# Patient Record
Sex: MALE | Race: WHITE | Employment: FULL TIME | URBAN - METROPOLITAN AREA
[De-identification: names, ages, dates, MRNs, and addresses within clinical notes are randomized per-mention and may not be internally consistent; named-entity substitution may affect disease eponyms.]

---

## 2020-06-26 ENCOUNTER — HOSPITAL ENCOUNTER (EMERGENCY)
Age: 75
Discharge: HOME | End: 2020-06-26
Payer: MEDICARE

## 2020-06-26 ENCOUNTER — APPOINTMENT (OUTPATIENT)
Dept: ULTRASOUND IMAGING | Age: 75
End: 2020-06-26
Payer: MEDICARE

## 2020-06-26 VITALS
RESPIRATION RATE: 16 BRPM | HEIGHT: 70 IN | DIASTOLIC BLOOD PRESSURE: 71 MMHG | BODY MASS INDEX: 27.2 KG/M2 | SYSTOLIC BLOOD PRESSURE: 128 MMHG | HEART RATE: 70 BPM | TEMPERATURE: 98.4 F | WEIGHT: 190 LBS | OXYGEN SATURATION: 98 %

## 2020-06-26 PROCEDURE — 93971 EXTREMITY STUDY: CPT

## 2020-06-26 PROCEDURE — 99283 EMERGENCY DEPT VISIT LOW MDM: CPT

## 2020-06-26 ASSESSMENT — PAIN SCALES - GENERAL: PAINLEVEL_OUTOF10: 5

## 2020-06-26 ASSESSMENT — PAIN DESCRIPTION - ORIENTATION: ORIENTATION: LEFT

## 2020-06-26 ASSESSMENT — PAIN DESCRIPTION - LOCATION: LOCATION: LEG

## 2020-06-26 ASSESSMENT — PAIN DESCRIPTION - PAIN TYPE: TYPE: ACUTE PAIN

## 2020-06-26 NOTE — ED PROVIDER NOTES
Paralysis, paresis, or recent cast immobilization of lower extremities no (0)     Recently bedridden ? 3 days, or major surgery requiring regional or                    general anesthetic in the past 12 weeks no (0)     Alternative diagnosis at least as likely yes (1)     TOTAL SCORE 2     * Those with Wells scores of two or more have a 28% chance of having DVT, those with    a lower score have 6% odds. ** Alternatively, Wells scores can be categorized as high if greater than two, moderate if      one or two, and low if less than one, with likelihoods of 53%, 17%, and 5%     respectively. Wells' Criteria for PE (possible score 0 to 12.5)       Clinical Signs & Symptoms of DVT   No      PE is #1 Dx or Equally Likely   No      Heart Rate >100   No      Immobillization at least 3 days or     Surgery in past 4 Weeks   No      Previous Diagnosed PE or DVT   No      Hemoptysis   No      Malignancy w/Tx in Past 6 Months or     Palliative Tx   No      TOTAL SCORE   0     Low Risk Group: 0-1.5 =  1.3-3 % incidence of PE  Mod Risk Group: 2-3.5 =  16.2 % Incidence of PE  Mod Risk Group: > 4 = 28% Incidence of PE  High Risk Group >5 = 40.6% Incidence of PE    ROS    Pertinent positives and negatives are stated within HPI, all other systems reviewed and are negative. Past Surgical History:  has a past surgical history that includes Kidney transplant; Kidney transplant (2001); Nasal septum surgery; knee surgery; Leg Surgery (Right); and Colonoscopy (12/28/2015). Social History:  reports that he has never smoked. He has quit using smokeless tobacco. He reports previous alcohol use. He reports that he does not use drugs. Family History: family history is not on file.    Allergies: Aspirin and Seasonal    Physical Exam          ED Triage Vitals   BP Temp Temp src Pulse Resp SpO2 Height Weight   06/26/20 1607 06/26/20 1604 -- 06/26/20 1604 06/26/20 1604 06/26/20 1604 06/26/20 1607 06/26/20 1607   128/71 98.4 °F (36.9 °C)  70 16 98 % 5' 10\" (1.778 m) 190 lb (86.2 kg)     Oxygen Saturation Interpretation: Normal.    General:  NAD. Alert and Oriented. Well-appearing. Skin:  Warm, dry. No rashes. Head:  Normocephalic. Atraumatic. Eyes:  EOMI. Conjunctiva normal.  ENT:  Oral mucosa moist.  Airway patent. Neck:  Supple. Normal ROM. Respiratory:  No respiratory distress. No labored breathing. Lungs clear without rales, rhonchi or wheezing. Cardiovascular:  Regular rate. Bilateral lower leg 1-2 peripheral edema. Extremities warm and good color. 2+ dorsalis pedis pulses bilateral.  Extremities:  Normal ROM. Localized tenderness to palpation to the posterior proximal left calf. Negative palpable cords. Dorsiflexion of the left ankle does exacerbate pain in the left calf. Back:  Normal ROM. Nontender to palpation. Neuro:  Alert and Oriented to person, place, time and situation. Normal LOC. Moves all extremities. Speech fluent. Psych:  Calm and Cooperative. Normal thought process. Normal judgement. Lab / Imaging Results   (All laboratory and radiology results have been personally reviewed by myself)  Labs:  No results found for this visit on 06/26/20. Imaging: All Radiology results interpreted by Radiologist unless otherwise noted. US DUP LOWER EXTREMITY LEFT JAE   Final Result   Negative for evidence of deep venous thrombosis in the left lower   extremity by color and spectral Doppler, as well as 2-D grayscale   ultrasound imaging. ED Course / Medical Decision Making   Medications - No data to display     Consult(s):   None    Procedure(s):   none    MDM:   Results discussed with patient. Advised to still follow-up with his family doctor. Counseling: The emergency provider has spoken with the patient and discussed todays results, in addition to providing specific details for the plan of care and counseling regarding the diagnosis and prognosis.   Questions are answered at this time and they are agreeable with the plan. Assessment      1. Leg swelling Stable   2. Pain of left calf New Problem     Plan   Discharge to home  Patient condition is good    New Medications     New Prescriptions    No medications on file     Electronically signed by AMADEO Cali   DD: 6/26/20  **This report was transcribed using voice recognition software. Every effort was made to ensure accuracy; however, inadvertent computerized transcription errors may be present.   END OF ED PROVIDER NOTE       Mio Cali  06/26/20 3067

## 2023-01-15 ENCOUNTER — HOSPITAL ENCOUNTER (INPATIENT)
Age: 78
LOS: 1 days | Discharge: HOME | DRG: 312 | End: 2023-01-16
Attending: EMERGENCY MEDICINE | Admitting: STUDENT IN AN ORGANIZED HEALTH CARE EDUCATION/TRAINING PROGRAM
Payer: MEDICARE

## 2023-01-15 ENCOUNTER — APPOINTMENT (OUTPATIENT)
Dept: GENERAL RADIOLOGY | Age: 78
DRG: 312 | End: 2023-01-15
Payer: MEDICARE

## 2023-01-15 DIAGNOSIS — R55 SYNCOPE AND COLLAPSE: Primary | ICD-10-CM

## 2023-01-15 LAB
ALBUMIN SERPL-MCNC: 3.5 G/DL (ref 3.5–5.2)
ALP BLD-CCNC: 72 U/L (ref 40–129)
ALT SERPL-CCNC: 22 U/L (ref 0–40)
ANION GAP SERPL CALCULATED.3IONS-SCNC: 15 MMOL/L (ref 7–16)
AST SERPL-CCNC: 28 U/L (ref 0–39)
BASOPHILS ABSOLUTE: 0.05 E9/L (ref 0–0.2)
BASOPHILS RELATIVE PERCENT: 0.6 % (ref 0–2)
BILIRUB SERPL-MCNC: 0.5 MG/DL (ref 0–1.2)
BUN BLDV-MCNC: 19 MG/DL (ref 6–23)
CALCIUM SERPL-MCNC: 10.3 MG/DL (ref 8.6–10.2)
CHLORIDE BLD-SCNC: 98 MMOL/L (ref 98–107)
CO2: 23 MMOL/L (ref 22–29)
CORTISOL TOTAL: 4.69 MCG/DL (ref 2.68–18.4)
CREAT SERPL-MCNC: 1.2 MG/DL (ref 0.7–1.2)
EOSINOPHILS ABSOLUTE: 0.03 E9/L (ref 0.05–0.5)
EOSINOPHILS RELATIVE PERCENT: 0.4 % (ref 0–6)
GFR SERPL CREATININE-BSD FRML MDRD: >60 ML/MIN/1.73
GLUCOSE BLD-MCNC: 216 MG/DL (ref 74–99)
HCT VFR BLD CALC: 46.7 % (ref 37–54)
HEMOGLOBIN: 15.1 G/DL (ref 12.5–16.5)
IMMATURE GRANULOCYTES #: 0.06 E9/L
IMMATURE GRANULOCYTES %: 0.7 % (ref 0–5)
INFLUENZA A BY PCR: NOT DETECTED
INFLUENZA B BY PCR: NOT DETECTED
LYMPHOCYTES ABSOLUTE: 1.57 E9/L (ref 1.5–4)
LYMPHOCYTES RELATIVE PERCENT: 18.9 % (ref 20–42)
MCH RBC QN AUTO: 28.7 PG (ref 26–35)
MCHC RBC AUTO-ENTMCNC: 32.3 % (ref 32–34.5)
MCV RBC AUTO: 88.6 FL (ref 80–99.9)
MONOCYTES ABSOLUTE: 0.92 E9/L (ref 0.1–0.95)
MONOCYTES RELATIVE PERCENT: 11.1 % (ref 2–12)
NEUTROPHILS ABSOLUTE: 5.67 E9/L (ref 1.8–7.3)
NEUTROPHILS RELATIVE PERCENT: 68.3 % (ref 43–80)
PDW BLD-RTO: 13 FL (ref 11.5–15)
PLATELET # BLD: 267 E9/L (ref 130–450)
PMV BLD AUTO: 9.9 FL (ref 7–12)
POTASSIUM SERPL-SCNC: 4.1 MMOL/L (ref 3.5–5)
PRO-BNP: 451 PG/ML (ref 0–450)
RBC # BLD: 5.27 E12/L (ref 3.8–5.8)
REASON FOR REJECTION: NORMAL
REJECTED TEST: NORMAL
SARS-COV-2, NAAT: NOT DETECTED
SODIUM BLD-SCNC: 136 MMOL/L (ref 132–146)
TOTAL PROTEIN: 6.8 G/DL (ref 6.4–8.3)
TROPONIN, HIGH SENSITIVITY: 11 NG/L (ref 0–11)
TSH SERPL DL<=0.05 MIU/L-ACNC: 3.65 UIU/ML (ref 0.27–4.2)
WBC # BLD: 8.3 E9/L (ref 4.5–11.5)

## 2023-01-15 PROCEDURE — 80053 COMPREHEN METABOLIC PANEL: CPT

## 2023-01-15 PROCEDURE — 6360000002 HC RX W HCPCS: Performed by: STUDENT IN AN ORGANIZED HEALTH CARE EDUCATION/TRAINING PROGRAM

## 2023-01-15 PROCEDURE — 85025 COMPLETE CBC W/AUTO DIFF WBC: CPT

## 2023-01-15 PROCEDURE — 93005 ELECTROCARDIOGRAM TRACING: CPT | Performed by: STUDENT IN AN ORGANIZED HEALTH CARE EDUCATION/TRAINING PROGRAM

## 2023-01-15 PROCEDURE — 84484 ASSAY OF TROPONIN QUANT: CPT

## 2023-01-15 PROCEDURE — 84443 ASSAY THYROID STIM HORMONE: CPT

## 2023-01-15 PROCEDURE — 93005 ELECTROCARDIOGRAM TRACING: CPT | Performed by: EMERGENCY MEDICINE

## 2023-01-15 PROCEDURE — 36415 COLL VENOUS BLD VENIPUNCTURE: CPT

## 2023-01-15 PROCEDURE — 2580000003 HC RX 258: Performed by: STUDENT IN AN ORGANIZED HEALTH CARE EDUCATION/TRAINING PROGRAM

## 2023-01-15 PROCEDURE — 87502 INFLUENZA DNA AMP PROBE: CPT

## 2023-01-15 PROCEDURE — 6370000000 HC RX 637 (ALT 250 FOR IP): Performed by: STUDENT IN AN ORGANIZED HEALTH CARE EDUCATION/TRAINING PROGRAM

## 2023-01-15 PROCEDURE — 83880 ASSAY OF NATRIURETIC PEPTIDE: CPT

## 2023-01-15 PROCEDURE — 87635 SARS-COV-2 COVID-19 AMP PRB: CPT

## 2023-01-15 PROCEDURE — 2060000000 HC ICU INTERMEDIATE R&B

## 2023-01-15 PROCEDURE — 99285 EMERGENCY DEPT VISIT HI MDM: CPT

## 2023-01-15 PROCEDURE — 82533 TOTAL CORTISOL: CPT

## 2023-01-15 PROCEDURE — 71045 X-RAY EXAM CHEST 1 VIEW: CPT

## 2023-01-15 RX ORDER — ACYCLOVIR 200 MG/1
400 CAPSULE ORAL EVERY 8 HOURS
Status: DISCONTINUED | OUTPATIENT
Start: 2023-01-15 | End: 2023-01-16 | Stop reason: HOSPADM

## 2023-01-15 RX ORDER — ACETAMINOPHEN 650 MG/1
650 SUPPOSITORY RECTAL EVERY 6 HOURS PRN
Status: DISCONTINUED | OUTPATIENT
Start: 2023-01-15 | End: 2023-01-16 | Stop reason: HOSPADM

## 2023-01-15 RX ORDER — MYCOPHENOLATE MOFETIL 250 MG/1
500 CAPSULE ORAL 2 TIMES DAILY
Status: DISCONTINUED | OUTPATIENT
Start: 2023-01-15 | End: 2023-01-16 | Stop reason: HOSPADM

## 2023-01-15 RX ORDER — LISINOPRIL 20 MG/1
20 TABLET ORAL DAILY
Status: DISCONTINUED | OUTPATIENT
Start: 2023-01-15 | End: 2023-01-15

## 2023-01-15 RX ORDER — ONDANSETRON 2 MG/ML
4 INJECTION INTRAMUSCULAR; INTRAVENOUS EVERY 6 HOURS PRN
Status: DISCONTINUED | OUTPATIENT
Start: 2023-01-15 | End: 2023-01-16 | Stop reason: HOSPADM

## 2023-01-15 RX ORDER — SULFAMETHOXAZOLE AND TRIMETHOPRIM 400; 80 MG/1; MG/1
1 TABLET ORAL DAILY
Status: DISCONTINUED | OUTPATIENT
Start: 2023-01-16 | End: 2023-01-16 | Stop reason: HOSPADM

## 2023-01-15 RX ORDER — SENNA PLUS 8.6 MG/1
1 TABLET ORAL DAILY PRN
Status: DISCONTINUED | OUTPATIENT
Start: 2023-01-15 | End: 2023-01-16 | Stop reason: HOSPADM

## 2023-01-15 RX ORDER — SODIUM CHLORIDE 9 MG/ML
INJECTION, SOLUTION INTRAVENOUS PRN
Status: DISCONTINUED | OUTPATIENT
Start: 2023-01-15 | End: 2023-01-16 | Stop reason: HOSPADM

## 2023-01-15 RX ORDER — POTASSIUM CHLORIDE 7.45 MG/ML
10 INJECTION INTRAVENOUS PRN
Status: DISCONTINUED | OUTPATIENT
Start: 2023-01-15 | End: 2023-01-16 | Stop reason: HOSPADM

## 2023-01-15 RX ORDER — CETIRIZINE HYDROCHLORIDE 10 MG/1
10 TABLET ORAL DAILY
Status: DISCONTINUED | OUTPATIENT
Start: 2023-01-15 | End: 2023-01-16 | Stop reason: HOSPADM

## 2023-01-15 RX ORDER — SIROLIMUS 1 MG/1
2 TABLET, FILM COATED ORAL DAILY
Status: DISCONTINUED | OUTPATIENT
Start: 2023-01-16 | End: 2023-01-16 | Stop reason: HOSPADM

## 2023-01-15 RX ORDER — PREDNISONE 1 MG/1
5 TABLET ORAL DAILY
Status: DISCONTINUED | OUTPATIENT
Start: 2023-01-15 | End: 2023-01-16 | Stop reason: HOSPADM

## 2023-01-15 RX ORDER — 0.9 % SODIUM CHLORIDE 0.9 %
1000 INTRAVENOUS SOLUTION INTRAVENOUS ONCE
Status: COMPLETED | OUTPATIENT
Start: 2023-01-15 | End: 2023-01-15

## 2023-01-15 RX ORDER — ENOXAPARIN SODIUM 100 MG/ML
40 INJECTION SUBCUTANEOUS DAILY
Status: DISCONTINUED | OUTPATIENT
Start: 2023-01-15 | End: 2023-01-16 | Stop reason: HOSPADM

## 2023-01-15 RX ORDER — ACETAMINOPHEN 325 MG/1
650 TABLET ORAL EVERY 6 HOURS PRN
Status: DISCONTINUED | OUTPATIENT
Start: 2023-01-15 | End: 2023-01-16 | Stop reason: HOSPADM

## 2023-01-15 RX ORDER — ONDANSETRON 4 MG/1
4 TABLET, ORALLY DISINTEGRATING ORAL EVERY 8 HOURS PRN
Status: DISCONTINUED | OUTPATIENT
Start: 2023-01-15 | End: 2023-01-16 | Stop reason: HOSPADM

## 2023-01-15 RX ORDER — SODIUM CHLORIDE 9 MG/ML
INJECTION, SOLUTION INTRAVENOUS ONCE
Status: COMPLETED | OUTPATIENT
Start: 2023-01-15 | End: 2023-01-16

## 2023-01-15 RX ORDER — LANOLIN ALCOHOL/MO/W.PET/CERES
3 CREAM (GRAM) TOPICAL NIGHTLY PRN
Status: DISCONTINUED | OUTPATIENT
Start: 2023-01-15 | End: 2023-01-16 | Stop reason: HOSPADM

## 2023-01-15 RX ORDER — CALCIUM CARBONATE 500(1250)
500 TABLET ORAL 2 TIMES DAILY
Status: DISCONTINUED | OUTPATIENT
Start: 2023-01-15 | End: 2023-01-16 | Stop reason: HOSPADM

## 2023-01-15 RX ORDER — SODIUM CHLORIDE 0.9 % (FLUSH) 0.9 %
10 SYRINGE (ML) INJECTION EVERY 12 HOURS SCHEDULED
Status: DISCONTINUED | OUTPATIENT
Start: 2023-01-15 | End: 2023-01-16 | Stop reason: HOSPADM

## 2023-01-15 RX ORDER — PRAVASTATIN SODIUM 10 MG
10 TABLET ORAL DAILY
Status: DISCONTINUED | OUTPATIENT
Start: 2023-01-15 | End: 2023-01-16 | Stop reason: HOSPADM

## 2023-01-15 RX ORDER — HYDRALAZINE HYDROCHLORIDE 20 MG/ML
10 INJECTION INTRAMUSCULAR; INTRAVENOUS EVERY 4 HOURS PRN
Status: DISCONTINUED | OUTPATIENT
Start: 2023-01-15 | End: 2023-01-16 | Stop reason: HOSPADM

## 2023-01-15 RX ORDER — POTASSIUM CHLORIDE 20 MEQ/1
40 TABLET, EXTENDED RELEASE ORAL PRN
Status: DISCONTINUED | OUTPATIENT
Start: 2023-01-15 | End: 2023-01-16 | Stop reason: HOSPADM

## 2023-01-15 RX ORDER — SODIUM CHLORIDE 0.9 % (FLUSH) 0.9 %
10 SYRINGE (ML) INJECTION PRN
Status: DISCONTINUED | OUTPATIENT
Start: 2023-01-15 | End: 2023-01-16 | Stop reason: HOSPADM

## 2023-01-15 RX ADMIN — Medication 500 MG: at 20:22

## 2023-01-15 RX ADMIN — MYCOPHENOLATE MOFETIL 500 MG: 250 CAPSULE ORAL at 20:22

## 2023-01-15 RX ADMIN — SODIUM CHLORIDE, PRESERVATIVE FREE 10 ML: 5 INJECTION INTRAVENOUS at 20:22

## 2023-01-15 RX ADMIN — SODIUM CHLORIDE: 9 INJECTION, SOLUTION INTRAVENOUS at 18:29

## 2023-01-15 RX ADMIN — ACYCLOVIR 400 MG: 200 CAPSULE ORAL at 20:22

## 2023-01-15 RX ADMIN — ENOXAPARIN SODIUM 40 MG: 40 INJECTION SUBCUTANEOUS at 18:33

## 2023-01-15 RX ADMIN — PRAVASTATIN SODIUM 10 MG: 10 TABLET ORAL at 20:22

## 2023-01-15 RX ADMIN — SODIUM CHLORIDE 1000 ML: 9 INJECTION, SOLUTION INTRAVENOUS at 16:41

## 2023-01-15 NOTE — ED NOTES
Report faxed Jessie Dodson at ext [phone] confirmed receipt.  Patient updated and prepared for transport     Dennie Members, RN  01/15/23 8721

## 2023-01-15 NOTE — CARE COORDINATION
Syncopal event with subsequent bracycardia and hypotension   TSH WNL  EKG showing sinus garrett LBBB  Ortho bps to be checked q shift   Will give gentle IVFs over night   Hold BB and flomax   Fall precautions   Check cortisol level chronic prednisone   Cardiology consult for AM

## 2023-01-16 VITALS
TEMPERATURE: 97.5 F | BODY MASS INDEX: 27.35 KG/M2 | RESPIRATION RATE: 18 BRPM | HEART RATE: 51 BPM | DIASTOLIC BLOOD PRESSURE: 70 MMHG | OXYGEN SATURATION: 98 % | WEIGHT: 191 LBS | SYSTOLIC BLOOD PRESSURE: 118 MMHG | HEIGHT: 70 IN

## 2023-01-16 LAB
ALBUMIN SERPL-MCNC: 3.2 G/DL (ref 3.5–5.2)
ALP BLD-CCNC: 67 U/L (ref 40–129)
ALT SERPL-CCNC: 23 U/L (ref 0–40)
ANION GAP SERPL CALCULATED.3IONS-SCNC: 10 MMOL/L (ref 7–16)
AST SERPL-CCNC: 22 U/L (ref 0–39)
BASOPHILS ABSOLUTE: 0.02 E9/L (ref 0–0.2)
BASOPHILS RELATIVE PERCENT: 0.3 % (ref 0–2)
BILIRUB SERPL-MCNC: 0.4 MG/DL (ref 0–1.2)
BUN BLDV-MCNC: 20 MG/DL (ref 6–23)
CALCIUM SERPL-MCNC: 9.7 MG/DL (ref 8.6–10.2)
CHLORIDE BLD-SCNC: 104 MMOL/L (ref 98–107)
CO2: 25 MMOL/L (ref 22–29)
CREAT SERPL-MCNC: 1 MG/DL (ref 0.7–1.2)
EKG ATRIAL RATE: 47 BPM
EKG ATRIAL RATE: 50 BPM
EKG P AXIS: 20 DEGREES
EKG P AXIS: 33 DEGREES
EKG P-R INTERVAL: 198 MS
EKG P-R INTERVAL: 228 MS
EKG Q-T INTERVAL: 464 MS
EKG Q-T INTERVAL: 490 MS
EKG QRS DURATION: 168 MS
EKG QRS DURATION: 168 MS
EKG QTC CALCULATION (BAZETT): 410 MS
EKG QTC CALCULATION (BAZETT): 446 MS
EKG R AXIS: -31 DEGREES
EKG R AXIS: 107 DEGREES
EKG T AXIS: -66 DEGREES
EKG T AXIS: 109 DEGREES
EKG VENTRICULAR RATE: 47 BPM
EKG VENTRICULAR RATE: 50 BPM
EOSINOPHILS ABSOLUTE: 0.14 E9/L (ref 0.05–0.5)
EOSINOPHILS RELATIVE PERCENT: 1.8 % (ref 0–6)
GFR SERPL CREATININE-BSD FRML MDRD: >60 ML/MIN/1.73
GLUCOSE BLD-MCNC: 131 MG/DL (ref 74–99)
HCT VFR BLD CALC: 40.5 % (ref 37–54)
HEMOGLOBIN: 13.1 G/DL (ref 12.5–16.5)
IMMATURE GRANULOCYTES #: 0.03 E9/L
IMMATURE GRANULOCYTES %: 0.4 % (ref 0–5)
LV EF: 65 %
LVEF MODALITY: NORMAL
LYMPHOCYTES ABSOLUTE: 2.58 E9/L (ref 1.5–4)
LYMPHOCYTES RELATIVE PERCENT: 33.8 % (ref 20–42)
MAGNESIUM: 1.9 MG/DL (ref 1.6–2.6)
MCH RBC QN AUTO: 28.8 PG (ref 26–35)
MCHC RBC AUTO-ENTMCNC: 32.3 % (ref 32–34.5)
MCV RBC AUTO: 89 FL (ref 80–99.9)
MONOCYTES ABSOLUTE: 1.23 E9/L (ref 0.1–0.95)
MONOCYTES RELATIVE PERCENT: 16.1 % (ref 2–12)
NEUTROPHILS ABSOLUTE: 3.63 E9/L (ref 1.8–7.3)
NEUTROPHILS RELATIVE PERCENT: 47.6 % (ref 43–80)
PDW BLD-RTO: 12.8 FL (ref 11.5–15)
PLATELET # BLD: 237 E9/L (ref 130–450)
PMV BLD AUTO: 10.1 FL (ref 7–12)
POTASSIUM REFLEX MAGNESIUM: 3.2 MMOL/L (ref 3.5–5)
RBC # BLD: 4.55 E12/L (ref 3.8–5.8)
SODIUM BLD-SCNC: 139 MMOL/L (ref 132–146)
TOTAL PROTEIN: 6 G/DL (ref 6.4–8.3)
WBC # BLD: 7.6 E9/L (ref 4.5–11.5)

## 2023-01-16 PROCEDURE — 6360000002 HC RX W HCPCS: Performed by: STUDENT IN AN ORGANIZED HEALTH CARE EDUCATION/TRAINING PROGRAM

## 2023-01-16 PROCEDURE — 80053 COMPREHEN METABOLIC PANEL: CPT

## 2023-01-16 PROCEDURE — 2580000003 HC RX 258: Performed by: STUDENT IN AN ORGANIZED HEALTH CARE EDUCATION/TRAINING PROGRAM

## 2023-01-16 PROCEDURE — 93010 ELECTROCARDIOGRAM REPORT: CPT | Performed by: INTERNAL MEDICINE

## 2023-01-16 PROCEDURE — 6370000000 HC RX 637 (ALT 250 FOR IP): Performed by: STUDENT IN AN ORGANIZED HEALTH CARE EDUCATION/TRAINING PROGRAM

## 2023-01-16 PROCEDURE — 83735 ASSAY OF MAGNESIUM: CPT

## 2023-01-16 PROCEDURE — 36415 COLL VENOUS BLD VENIPUNCTURE: CPT

## 2023-01-16 PROCEDURE — 85025 COMPLETE CBC W/AUTO DIFF WBC: CPT

## 2023-01-16 PROCEDURE — 93306 TTE W/DOPPLER COMPLETE: CPT

## 2023-01-16 RX ADMIN — Medication 500 MG: at 09:33

## 2023-01-16 RX ADMIN — SULFAMETHOXAZOLE AND TRIMETHOPRIM 1 TABLET: 400; 80 TABLET ORAL at 09:34

## 2023-01-16 RX ADMIN — SIROLIMUS 2 MG: 1 TABLET ORAL at 09:34

## 2023-01-16 RX ADMIN — SODIUM CHLORIDE, PRESERVATIVE FREE 10 ML: 5 INJECTION INTRAVENOUS at 09:35

## 2023-01-16 RX ADMIN — POTASSIUM CHLORIDE 40 MEQ: 1500 TABLET, EXTENDED RELEASE ORAL at 09:46

## 2023-01-16 RX ADMIN — PREDNISONE 5 MG: 5 TABLET ORAL at 09:34

## 2023-01-16 RX ADMIN — MYCOPHENOLATE MOFETIL 500 MG: 250 CAPSULE ORAL at 09:34

## 2023-01-16 NOTE — CONSULTS
[facility] cardiology/[facility] [address] [place] [address]    Name: Joceline Evans    Age: 69 y.o. 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 The patient is a 69y.o. year old male this very nice gentleman has a known history of Wegener's granulomatosis followed by the [facility] and prior renal transplant in approximately 2001, chronic hypertension, hyperlipidemia. He reports on Thursday, January 12, 2023 he had a COVID booster vaccine and later that evening had discomfort in his arm and was not feeling well over the next couple of days with some chills and poor appetite. No distinct nausea or vomiting but did not feel like eating much. 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 There was no fall trauma as he went to the ground gently. No indication of head trauma or long bone trauma or currently any reported discomfort. He denies any prior episodes like this. No significant dizziness or stroke symptoms. He was noted to be bradycardic heart rate in the 50 range on presentation with left bundle branch block on EKG. This morning he reports he feels well without lightheadedness, no shortness of breath or chest pain. No recent exertional chest symptoms nothing indicative of unstable angina or heart failure symptoms. Echocardiogram completed today shows normal LVEF and no significant valve abnormality. Past Medical History: As above, longstanding hypertension. Past surgical history: Kidney transplant 2001    Social history: Does not smoke no significant caffeine or alcohol consumption.       Review of Systems: Constitutional: No fever, chills, sweats  Cardiac: As per HPI  Pulmonary: No cough, wheeze, hemoptysis  HEENT: No visual disturbances or difficult swallowing  GI: No nausea, vomiting, diarrhea, abdominal pain, rectal bleeding  : No dysuria or hematuria  Endocrine: No excessive thirst, heat or cold intolerance. Musculoskeletal: No joint pain or muscle aches. No claudication  Skin: No skin breakdown or rashes  Neuro: No headache, confusion, or seizures  Psych: No depression, anxiety      Family History:  No family history on file. Social History:  Social History     Socioeconomic History    Marital status:      Spouse name: Not on file    Number of children: Not on file    Years of education: Not on file    Highest education level: Not on file   Occupational History    Not on file   Tobacco Use    Smoking status: Never    Smokeless tobacco: Former   Vaping Use    Vaping Use: Never used   Substance and Sexual Activity    Alcohol use: Not Currently    Drug use: No    Sexual activity: Not on file   Other Topics Concern    Not on file   Social History Narrative    Not on file     Social Determinants of Health     Financial Resource Strain: Not on file   Food Insecurity: Not on file   Transportation Needs: Not on file   Physical Activity: Not on file   Stress: Not on file   Social Connections: Not on file   Intimate Partner Violence: Not on file   Housing Stability: Not on file       Allergies: Allergies   Allergen Reactions    Aspirin Shortness Of Breath and Hives     SWELLING AND SOB    Seasonal      Other reaction(s):  Other: See Comments  Sneezing       Current Medications:  Current Facility-Administered Medications   Medication Dose Route Frequency Provider Last Rate Last Admin    sodium chloride flush 0.9 % injection 10 mL  10 mL IntraVENous 2 times per day Ninoska Gonzalez MD   10 mL at 01/15/23 2022    sodium chloride flush 0.9 % injection 10 mL  10 mL IntraVENous PRN Ninoska Gonzalez MD        0.9 % sodium chloride infusion   IntraVENous PRN Rukhsana Kaplan MD        potassium chloride (KLOR-CON M) extended release tablet 40 mEq  40 mEq Oral PRN Rukhsana Kaplan MD        Or    potassium bicarb-citric acid (EFFER-K) effervescent tablet 40 mEq  40 mEq Oral PRN Rukhsana Kaplan MD        Or    potassium chloride 10 mEq/100 mL IVPB (Peripheral Line)  10 mEq IntraVENous PRN Rukhsana Kaplan MD        enoxaparin (LOVENOX) injection 40 mg  40 mg SubCUTAneous Daily Rukhsana Kaplan MD   40 mg at 01/15/23 1833    ondansetron (ZOFRAN-ODT) disintegrating tablet 4 mg  4 mg Oral Q8H PRN Rukhsana Kaplan MD        Or    ondansetron [facility]) injection 4 mg  4 mg IntraVENous Q6H PRN Rukhsana Kaplan MD        senna (SENOKOT) tablet 8.6 mg  1 tablet Oral Daily PRN Rukhsana Kaplan MD        acetaminophen (TYLENOL) tablet 650 mg  650 mg Oral Q6H PRN Rukhsana Kaplan MD        Or    acetaminophen (TYLENOL) suppository 650 mg  650 mg Rectal Q6H PRN Rukhsana Kaplan MD        calcium elemental (OSCAL) tablet 500 mg  500 mg Oral BID Rukhsana Kaplan MD   500 mg at 01/15/23 2022    cetirizine (ZYRTEC) tablet 10 mg  10 mg Oral Daily Rukhsana Kaplan MD        acyclovir (ZOVIRAX) capsule 400 mg  400 mg Oral Q8H Rukhsana Kaplan MD   400 mg at 01/15/23 2022    mycophenolate (CELLCEPT) capsule 500 mg  500 mg Oral BID Rukhsana Kaplan MD   500 mg at 01/15/23 2022    pravastatin (PRAVACHOL) tablet 10 mg  10 mg Oral Daily Rukhsana Kaplan MD   10 mg at 01/15/23 2022    predniSONE (DELTASONE) tablet 5 mg  5 mg Oral Daily Rukhsana Kaplan MD        sirolimus (RAPAMUNE) tablet 2 mg  2 mg Oral Daily Rukhsana Kaplan MD        sulfamethoxazole-trimethoprim (BACTRIM;SEPTRA) 400-80 MG per tablet 1 tablet  1 tablet Oral Daily Rukhsana Kaplan MD        hydrALAZINE (APRESOLINE) injection 10 mg  10 mg IntraVENous Q4H PRN Rukhsana Kaplan MD        melatonin tablet 3 mg  3 mg Oral Nightly PRN Rukhsana Kaplan MD        perflutren lipid microspheres (DEFINITY) injection 1.5 mL  1.5 mL IntraVENous ONCE PRN Booker Llanes MD          sodium chloride         Physical Exam:  /70   Pulse 51   Temp 97.5 °F (36.4 °C) (Oral)   Resp 18   Ht 5' 10\" (1.778 m)   Wt 191 lb (86.6 kg)   SpO2 98%   BMI 27.41 kg/m²   Weight change: Wt Readings from Last 3 Encounters:   01/16/23 191 lb (86.6 kg)   06/26/20 190 lb (86.2 kg)   06/16/16 200 lb (90.7 kg)         General: Awake, alert, oriented x3, no acute distress  HEENT: Unremarkable  Neck: No JVD or bruits. Cardiac: Regular rate and rhythm, normal S1 and S2, no extra heart sounds, murmurs, heaves, thrills  Resp: Lungs clear without wheezing or crackles. No accessory muscle use or retraction  Abdomen: soft, nontender, nondistended, no gross organomegaly or mass  Skin: Warm and dry, no cyanosis. Musculoskeletal: normal tone and strength in the upper and lower extremities bilaterally  Neuro: Grossly unremarkable  Psych: Cooperative, and normal affect    Intake/Output:  No intake or output data in the 24 hours ending 01/16/23 0917  No intake/output data recorded. Laboratory Tests:  Lab Results   Component Value Date    CREATININE 1.0 01/16/2023    BUN 20 01/16/2023     01/16/2023    K 3.2 (L) 01/16/2023     01/16/2023    CO2 25 01/16/2023     No results for input(s): CKTOTAL, CKMB in the last 72 hours.     Invalid input(s): TROPONONI  No results found for: BNP  Lab Results   Component Value Date/Time    WBC 7.6 01/16/2023 04:25 AM    RBC 4.55 01/16/2023 04:25 AM    HGB 13.1 01/16/2023 04:25 AM    HCT 40.5 01/16/2023 04:25 AM    MCV 89.0 01/16/2023 04:25 AM    MCH 28.8 01/16/2023 04:25 AM    MCHC 32.3 01/16/2023 04:25 AM    RDW 12.8 01/16/2023 04:25 AM     01/16/2023 04:25 AM    MPV 10.1 01/16/2023 04:25 AM     Recent Labs     01/15/23  1354 01/16/23  0425   ALKPHOS 72 67   ALT 22 23   AST 28 22   PROT 6.8 6.0*   BILITOT 0.5 0.4   LABALBU 3.5 3.2*     Lab Results   Component Value Date/Time    MG 1.9 01/16/2023 04:25 AM     Lab Results   Component Value Date/Time    PROTIME 11.7 06/16/2016 02:20 AM    INR 1.1 06/16/2016 02:20 AM     Lab Results   Component Value Date/Time    TSH 3.650 01/15/2023 01:54 PM     No components found for: CHLPL  No results found for: TRIG  No results found for: HDL  No results found for: [address]  Lab Results   Component Value Date    INR 1.1 06/16/2016       Admission ECG January 15, 2023 at 1314 and 1428 personally reviewed by me revealed normal sinus rhythm initial EKG heart rate of 50  QT C4 46  and follow-up EKG at 1428 normal sinus rhythm 47 UT interval 228  . I personally reviewed his telemetry and it shows normal sinus rhythm this morning heart rate in the low 60s. No AV block or pauses on telemetry overnight. ASSESSMENT / PLAN:    1. 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 He also is on several blood pressure medications and would stop diuretic the chlorthalidone that he was on. Also stop metoprolol succinate as that certainly is adding to his bradycardia. Has received IV fluid and blood pressure reasonable. Heart rate improved. No indication for permanent pacemaker. #2 bradycardia likely related to metoprolol and would stop the metoprolol together and has been on it for over 20 years by his report. Would avoid AV blocking agents of no diltiazem, verapamil or metoprolol or any medications with slow heart rate. #3 left bundle branch block on EKG. Indication of underlying conduction abnormality on EKG. Also UT interval that is mildly prolonged so would avoid any medications that would slow heart rate or AV blockers. #4 hypertension long-term continue lisinopril and may have to increase up to 20 mg daily.   Also in the future could consider amlodipine 5 mg daily which would not cause any bradycardia or AV block. #5 prostate and urinary issues would stop and avoid Flomax now or in the future. Also on larger dose of doxazosin I believe per his wife 10 mg and would take this at night and may have to in the future reduce dosing. Echocardiogram completed this morning shows good LV function. No significant valve abnormality. Ambulate on telemetry and could be discharged to home this afternoon or this evening from cardiology viewpoint and plan outpatient follow-up [facility] cardiology in 1 month.         Electronically signed by Isabella Eaton MD on 1/16/2023 at 9:17 AM good hygiene

## 2023-01-16 NOTE — PROGRESS NOTES
Discharge instructions given to patient and his wife regarding follow-up appointments and medications.

## 2023-01-16 NOTE — ED PROVIDER NOTES
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 This episode happened approximately noon today before patient came to the emergency department. 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 Patient denies any head injury with this episode. He did note that he was feeling slightly ill the night before with fatigue but otherwise fairly nonspecific symptoms. He did endorse prodromal symptoms before falling including slight dizziness and haziness in his vision. This is since resolved since coming to the ED. Patient otherwise denies any other symptoms at this time including nausea, vomiting, fever, chills, headache, lightheadedness, cough, congestion, sore throat, urinary symptoms, abdominal pain, constipation or diarrhea. On initial evaluation, patient is nontoxic-appearing and in no acute distress. He is denying any recent sick contacts or illnesses.   Patient denying any similar episodes like this in the past.    Nursing Notes were all reviewed and agreed with or any disagreements were addressed in the HPI.    ROS:   Pertinent positives and negatives are stated within HPI, all other systems reviewed and are negative.    --------------------------------------------- PAST HISTORY ---------------------------------------------  Past Medical History:  has a past medical history of Cellulitis of right leg, Chronic kidney disease, Encounter for screening colonoscopy, Hyperlipidemia, Hypertension, Immune deficiency disorder ([facility] [address] AJ on CPAP, and Wegener's granulomatosis (granulomatosis with polyangiitis) ([facility] [address] Past Surgical History:  has a past surgical history that includes Kidney transplant; Kidney transplant (2001); Nasal septum surgery; knee surgery; Leg Surgery (Right); and Colonoscopy (12/28/2015). Social History:  reports that he has never smoked. He has quit using smokeless tobacco. He reports that he does not currently use alcohol. He reports that he does not use drugs. Family History: family history is not on file. The patients home medications have been reviewed. Allergies: Aspirin and Seasonal    ---------------------------------------------------PHYSICAL EXAM--------------------------------------    Constitutional/General: Alert and oriented x3, well appearing, non toxic in NAD  Head: Normocephalic and atraumatic  Mouth: Oropharynx clear, handling secretions, no trismus  Neck: Supple, full ROM,  Pulmonary: Lungs clear to auscultation bilaterally, no wheezes, rales, or rhonchi. Not in respiratory distress  Cardiovascular:  bradycardic rate. Regular rhythm. No murmurs  Chest: no chest wall tenderness  Abdomen: Soft. Non tender. Non distended. No rebound, guarding, or rigidity. No pulsatile masses appreciated. Musculoskeletal: Moves all extremities x 4. Warm and well perfused, no clubbing, cyanosis, or edema. Capillary refill <3 seconds  Skin: warm and dry. No rashes.    Neurologic: GCS 15, no gross focal neurologic deficits  Psych: Normal Affect    -------------------------------------------------- RESULTS -------------------------------------------------  I have personally reviewed all laboratory and imaging results for this patient. Results are listed below.      LABS:  Results for orders placed or performed during the hospital encounter of 01/15/23   COVID-19, Rapid    Specimen: Nasopharyngeal Swab   Result Value Ref Range    SARS-CoV-2, NAAT Not Detected Not Detected   RAPID INFLUENZA A/B ANTIGENS    Specimen: Nasopharyngeal   Result Value Ref Range    Influenza A by PCR Not Detected Not Detected    Influenza B by PCR Not Detected Not Detected   CMP   Result Value Ref Range    Sodium 136 132 - 146 mmol/L    Potassium 4.1 3.5 - 5.0 mmol/L    Chloride 98 98 - 107 mmol/L    CO2 23 22 - 29 mmol/L    Anion Gap 15 7 - 16 mmol/L    Glucose 216 (H) 74 - 99 mg/dL    BUN 19 6 - 23 mg/dL    Creatinine 1.2 0.7 - 1.2 mg/dL    Est, Glom Filt Rate >60 >=60 mL/min/1.73    Calcium 10.3 (H) 8.6 - 10.2 mg/dL    Total Protein 6.8 6.4 - 8.3 g/dL    Albumin 3.5 3.5 - 5.2 g/dL    Total Bilirubin 0.5 0.0 - 1.2 mg/dL    Alkaline Phosphatase 72 40 - 129 U/L    ALT 22 0 - 40 U/L    AST 28 0 - 39 U/L   Brain Natriuretic Peptide   Result Value Ref Range    Pro- (H) 0 - 450 pg/mL   TSH   Result Value Ref Range    TSH 3.650 0.270 - 4.200 uIU/mL   SPECIMEN REJECTION   Result Value Ref Range    Rejected Test cbcwd     Reason for Rejection see below    CBC with Auto Differential   Result Value Ref Range    WBC 8.3 4.5 - 11.5 E9/L    RBC 5.27 3.80 - 5.80 E12/L    Hemoglobin 15.1 12.5 - 16.5 g/dL    Hematocrit 46.7 37.0 - 54.0 %    MCV 88.6 80.0 - 99.9 fL    MCH 28.7 26.0 - 35.0 pg    MCHC 32.3 32.0 - 34.5 %    RDW 13.0 11.5 - 15.0 fL    Platelets 290 691 - 076 E9/L    MPV 9.9 7.0 - 12.0 fL    Neutrophils % 68.3 43.0 - 80.0 %    Immature Granulocytes % 0.7 0.0 - 5.0 %    Lymphocytes % 18.9 (L) 20.0 - 42.0 %    Monocytes % 11.1 2.0 - 12.0 %    Eosinophils % 0.4 0.0 - 6.0 %    Basophils % 0.6 0.0 - 2.0 %    Neutrophils Absolute 5.67 1.80 - 7.30 E9/L    Immature Granulocytes # 0.06 E9/L    Lymphocytes Absolute 1. 57 1.50 - 4.00 E9/L    Monocytes Absolute 0.92 0.10 - 0.95 E9/L    Eosinophils Absolute 0.03 (L) 0.05 - 0.50 E9/L    Basophils Absolute 0.05 0.00 - 0.20 E9/L   Troponin   Result Value Ref Range    Troponin, High Sensitivity 11 0 - 11 ng/L   EKG 12 Lead   Result Value Ref Range    Ventricular Rate 50 BPM    Atrial Rate 50 BPM    P-R Interval 198 ms    QRS Duration 168 ms    Q-T Interval 490 ms    QTc Calculation (Bazett) 446 ms    P Axis 20 degrees    R Axis 107 degrees    T Axis -66 degrees       RADIOLOGY:   Interpretation per the Radiologist below, if available at the time of this note:    XR CHEST PORTABLE   Final Result   No pneumonia or pleural effusion. XR CHEST PORTABLE    Result Date: 1/15/2023  EXAMINATION: ONE XRAY VIEW OF THE CHEST 1/15/2023 1:43 pm COMPARISON: September 20, 2012 HISTORY: ORDERING SYSTEM PROVIDED HISTORY: syncope TECHNOLOGIST PROVIDED HISTORY: Reason for exam:->syncope FINDINGS: No airspace opacity or pleural effusion. Minimal subsegmental atelectasis in left lung base. The heart is normal size. No pneumothorax. No free air beneath the hemidiaphragms. No pneumonia or pleural effusion. No results found. See preliminary interpretation by me below. EKG: This EKG is signed and interpreted by me. Sinus bradycardia with ventricular rate of 50 bpm.  NC interval normal.  QTc not prolonged. No evidence of acute ST elevation MI. Left bundle branch block. No previous EKG on file for comparison. ------------------------- NURSING NOTES AND VITALS REVIEWED ---------------------------   The nursing notes within the ED encounter and vital signs as below have been reviewed by myself. BP (!) 149/73   Pulse 53   Temp 97.7 °F (36.5 °C) (Oral)   Resp 18   Ht 5' 10\" (1.778 m)   Wt 190 lb (86.2 kg)   SpO2 98%   BMI 27.26 kg/m²   Oxygen Saturation Interpretation: Normal    The patients available past medical records and past encounters were reviewed. ------------------------------ ED COURSE/MEDICAL DECISION MAKING----------------------  Medications   sodium chloride flush 0.9 % injection 10 mL (10 mLs IntraVENous Given 1/15/23 2022)   sodium chloride flush 0.9 % injection 10 mL (has no administration in time range)   0.9 % sodium chloride infusion (has no administration in time range)   potassium chloride (KLOR-CON M) extended release tablet 40 mEq (has no administration in time range)     Or   potassium bicarb-citric acid (EFFER-K) effervescent tablet 40 mEq (has no administration in time range)     Or   potassium chloride 10 mEq/100 mL IVPB (Peripheral Line) (has no administration in time range)   enoxaparin (LOVENOX) injection 40 mg (40 mg SubCUTAneous Given 1/15/23 1833)   ondansetron (ZOFRAN-ODT) disintegrating tablet 4 mg (has no administration in time range)     Or   ondansetron (ZOFRAN) injection 4 mg (has no administration in time range)   senna (SENOKOT) tablet 8.6 mg (has no administration in time range)   acetaminophen (TYLENOL) tablet 650 mg (has no administration in time range)     Or   acetaminophen (TYLENOL) suppository 650 mg (has no administration in time range)   calcium elemental (OSCAL) tablet 500 mg (500 mg Oral Given 1/15/23 2022)   cetirizine (ZYRTEC) tablet 10 mg (10 mg Oral Not Given 1/15/23 1833)   acyclovir (ZOVIRAX) capsule 400 mg (400 mg Oral Given 1/15/23 2022)   mycophenolate (CELLCEPT) capsule 500 mg (500 mg Oral Given 1/15/23 2022)   pravastatin (PRAVACHOL) tablet 10 mg (10 mg Oral Given 1/15/23 2022)   predniSONE (DELTASONE) tablet 5 mg (5 mg Oral Not Given 1/15/23 1835)   sirolimus (RAPAMUNE) tablet 2 mg (has no administration in time range)   sulfamethoxazole-trimethoprim (BACTRIM;SEPTRA) 400-80 MG per tablet 1 tablet (has no administration in time range)   hydrALAZINE (APRESOLINE) injection 10 mg (has no administration in time range)   melatonin tablet 3 mg (has no administration in time range)   perflutren lipid microspheres (DEFINITY) injection 1.5 mL (has no administration in time range)   0.9 % sodium chloride bolus (0 mLs IntraVENous Stopped 1/15/23 1850)   0.9 % sodium chloride infusion ( IntraVENous New Bag 1/15/23 1829)       Medical Decision Making/Differential Diagnosis:    CC/HPI Summary, Pertinent Physical Exam Findings, Social Determinants of health, Records Reviewed, DDx, testing done/not done, ED Course, Reassessment, disposition considerations/shared decision making with patient, consults, disposition:        Medical Decision Making:   I, Dr. Bulmaro Gallo am the resident physician of record. History From: Patient    Limitations to history : None     Costa Juarez is a 69 y.o. male who presents to the ED for syncope  Vital signs upon arrival BP (!) 149/73   Pulse 53   Temp 97.7 °F (36.5 °C) (Oral)   Resp 18   Ht 5' 10\" (1.778 m)   Wt 190 lb (86.2 kg)   SpO2 98%   BMI 27.26 kg/m²     On initial evaluation, patient is nontoxic-appearing, afebrile, hemodynamically stable in no acute distress. He was awake alert and oriented x3 and answering questions/following commands appropriately. Patient able to move all 4 extremities without difficulty. Working diagnoses include but not limited to vertigo, orthostatic hypotension, acute vestibular syndrome, hypertensive urgency/emergency, cardiac arrhythmia, dehydration, CVA, TIA, ACS. Physical exam was essentially benign. Motor and sensation were grossly intact. No focal neurological deficits noted. No resting nystagmus or truncal ataxia seen. No nuchal rigidity or meningeal signs. Lungs are clear to auscultation with no wheezes, rales or rhonchi. Abdomen was soft, nontender nondistended. No other concerning physical exam findings. Work-up in the emergency department as interpreted by me includes CBC with no leukocytosis, left shift or significant anemia. WBC 8.5 and hemoglobin stable 15.1. CMP with stable renal function, creatinine 1.2/BUN 19.   No major electrolyte abnormalities noted.  LFTs within normal limits.  Viral testing negative for COVID and influenza.  TSH normal at 3.650.  Cardiac evaluation generally unremarkable.  Initial troponin of 11.  EKG sinus bradycardia with no acute ischemic changes.  Chest x-ray was clear.  Patient was given 1 L of IV fluids, 0.9 NS in the emergency department.  Initial blood pressure of 92/53 improved after fluids to 120-130 systolic.  Heart rate remained bradycardic on monitor in the ED.  Patient was denying any acute symptoms while in the emergency department.  Given his presentation and risk factors, patient would benefit from admission for further work-up and evaluation.  Discussed work-up and results with patient and wife who are agreeable with admission after shared decision making.  Spoke with Dr. Srivastava who accepted the patient for admission.  Patient remained hemodynamically stable in the ED.    Non-plain film images such as CT, Ultrasound and MRI are read by the radiologist. Plain radiographic images are visualized and preliminarily interpreted by the ED Provider with the below findings:    Chest x-ray with no obvious focal consolidation suggestive of pneumonia, large pleural effusions or pneumothorax.  Normal cardiac silhouette.      Discussion with Other Profesionals : None    Social Determinants : None    Records Reviewed : Care Everywhere Old EKG showing LLB and bradycardia     Chronic conditions: hypertension, hyperlipidemia, CKD, Cristino's granulomatosis, AJ on CPAP and immune deficiency disorder     CONSULTS: Internal medicine for admission      Disposition:   Admission    Consultation with Dr. Srivastava who accepted the patient for admission .  The patient will be admitted for further treatment and evaluation for   1. Syncope and collapse        Re-Evaluations/Consultations:             ED Course as of 01/15/23 2044   Sun Ky 15, 2023   1630 Patient accepted for admission by Dr. Srivastava. [PP]      ED Course  User Index  [PP] Pilar Long DO         This patient's ED course included: History, physical examination, reevaluation prior to disposition    This patient has remained hemodynamically stable during their ED course. Counseling: The emergency provider has spoken with the patient and discussed todays results, in addition to providing specific details for the plan of care and counseling regarding the diagnosis and prognosis. Questions are answered at this time and they are agreeable with the plan.       --------------------------------- IMPRESSION AND DISPOSITION ---------------------------------    IMPRESSION  1. Syncope and collapse        DISPOSITION  Disposition: Admit to telemetry  Patient condition is stable        NOTE: This report was transcribed using voice recognition software.  Every effort was made to ensure accuracy; however, inadvertent computerized transcription errors may be present         Pilar Long DO  Resident  01/15/23 8133

## 2023-01-16 NOTE — H&P
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 year old male who presented with a chief complaint of pre syncope     Patient presented with an episode of near syncope where he had an episode where he felt as if he was going to pass out, he grabbed a hold of something and was able to gentle sit down on a step without injuring himself. He did not pass out, he did not have LOC, he did not hit his head. He did seek medical care at the insistence of his wife. The episode did occur after he received a booster vaccine and he seemed to have a systemic response to it with sx of subjective fevers chills and sweats, he thinks he may have been dehydrated as well. He was noted to be bradycardic in the ED and was admitted for further workup and evaluation. Past Medical History:   Diagnosis Date    Cellulitis of right leg 2012    Chronic kidney disease     Encounter for screening colonoscopy 2015    Hyperlipidemia     Hypertension     Immune deficiency disorder ([facility] [address] 2012    AJ on CPAP     Wegener's granulomatosis (granulomatosis with polyangiitis) ([facility] [address]     in remission since        Past Surgical History:   Procedure Laterality Date    COLONOSCOPY  2015    KIDNEY TRANSPLANT      KIDNEY TRANSPLANT      [facility]    KNEE SURGERY      LEG SURGERY Right     cellulitis    NASAL SEPTUM SURGERY         No family history on file. Social History  Patient lives at home with wife . At baseline patient ambulates with out assistance   Illicit drugs: Denies   TOBACCO:   reports that he has never smoked.  He has quit using smokeless tobacco.  ETOH:   reports that he does not currently use alcohol. Home Medications  Prior to Admission medications    Medication Sig Start Date End Date Taking? Authorizing Provider   tamsulosin (FLOMAX) 0.4 MG capsule Take 1 capsule by mouth daily for 10 days 6/16/16 6/26/16  Lance James MD   metoprolol (TOPROL-XL) 100 MG XL tablet Take 100 mg by mouth daily Instructed to take with sip water am of procedure    Historical Provider, MD   famciclovir (FAMVIR) 250 MG tablet Take 250 mg by mouth nightly     Historical Provider, MD   doxazosin (CARDURA) 2 MG tablet Take 2 mg by mouth nightly    Historical Provider, MD   potassium chloride (KLOR-CON) 20 MEQ packet Take 20 mEq by mouth every other day    Historical Provider, MD   sirolimus (RAPAMUNE) 1 MG tablet Take 2 mg by mouth daily 3mgm and 2mgm alternating once daily  Instructed to take with sip water am of procedure    Historical Provider, MD   mycophenolate (CELLCEPT) 500 MG tablet Take 500 mg by mouth 2 times daily Instructed to take with sip water am of procedure    Historical Provider, MD   lisinopril (PRINIVIL;ZESTRIL) 10 MG tablet Take 20 mg by mouth daily     Historical Provider, MD   pravastatin (PRAVACHOL) 10 MG tablet Take 10 mg by mouth daily. Historical Provider, MD   calcium carbonate (OSCAL) 500 MG TABS tablet Take 500 mg by mouth 2 times daily. Historical Provider, MD   sulfamethoxazole-trimethoprim (BACTRIM;SEPTRA) 400-80 MG per tablet Take 1 tablet by mouth daily. Historical Provider, MD   predniSONE (DELTASONE) 5 MG tablet Take 5 mg by mouth daily. Historical Provider, MD   cetirizine (ZYRTEC) 10 MG tablet Take 10 mg by mouth daily. Historical Provider, MD   chlorthalidone (HYGROTEN) 25 MG tablet Take 25 mg by mouth daily. Historical Provider, MD       Allergies  Allergies   Allergen Reactions    Aspirin Shortness Of Breath and Hives     SWELLING AND SOB    Seasonal      Other reaction(s):  Other: See Comments  Sneezing       Review of Systems  Please see HPI above. All bolded are positive. All un-bolded are negative. Constitutional Symptoms: fever, chills, fatigue, generalized weakness, diaphoresis, increase in thirst, loss of appetite  Eyes: vision change   Ears, Nose, Mouth, Throat: hearing loss, nasal congestion, sores in the mouth  Cardiovascular: chest pain, chest heaviness, palpitations  Respiratory: shortness of breath, wheezing, coughing  Gastrointestinal: abdominal pain, nausea, vomiting, diarrhea, constipation, melena, hematochezia, hematemesis  Genitourinary: dysuria, hematuria, increased frequency  Musculoskeletal: lower extremity edema, myalgias, arthralgias, back pain  Integumentary: rashes, itching   Neurological: headache, lightheadedness, dizziness, confusion, syncope, numbness, tingling, focal weakness  Psychiatric: depression, suicidal ideation, anxiety  Endocrine: unintentional weight change  Hematologic/Lymphatic: lymphadenopathy, easy bruising, easy bleeding   Allergic/Immunologic: recurrent infections      Objective  VITALS:  /70   Pulse 51   Temp 97.5 °F (36.4 °C) (Oral)   Resp 18   Ht 5' 10\" (1.778 m)   Wt 191 lb (86.6 kg)   SpO2 98%   BMI 27.41 kg/m²     Physical Exam:  General: awake, alert, oriented to person, place, time, and purpose, appears stated age, cooperative, no acute distress, pleasant, appropriate mood  Eyes: conjunctivae/corneas clear, sclera non icteric, EOMI  Ears: no obvious scars, no lesions, no masses, hearing intact  Mouth: mucous membranes moist, no obvious oral sores  Head: normocephalic, atraumatic  Neck: no JVD, no adenopathy, no thyromegaly, neck is supple, trachea is midline  Back: ROM normal, no CVA tenderness.   Chest: no pain on palpation  Lungs: clear to auscultation bilaterally, without rhonchi, crackle, wheezing, or rale, no retractions or use of accessory muscles  Heart: regular rate and regular rhythm, no murmur, normal S1, S2  Abdomen: soft, non-tender; bowel sounds normal; no masses, no organomegaly  : Deferred   Extremities: no lower extremity edema, extremities atraumatic, no cyanosis, no clubbing, 2+ pedal pulses palpated  Skin: normal color, normal texture, normal turgor, no rashes, no lesions  Neurologic:5/5 muscle strength throughout, normal muscle tone throughout, face symmetric, hearing intact, tongue midline, speech appropriate without slurring, sensation to fine touch intact in upper and lower extremities    Labs-   Lab Results   Component Value Date    WBC 7.6 01/16/2023    HGB 13.1 01/16/2023    HCT 40.5 01/16/2023     01/16/2023     01/16/2023    K 3.2 (L) 01/16/2023     01/16/2023    CREATININE 1.0 01/16/2023    BUN 20 01/16/2023    CO2 25 01/16/2023    GLUCOSE 131 (H) 01/16/2023    ALT 23 01/16/2023    AST 22 01/16/2023    INR 1.1 06/16/2016     No results found for: CKTOTAL, CKMB, CKMBINDEX, TROPONINI    Last echocardiogram:      Recent Radiological Studies:  XR CHEST PORTABLE   Final Result   No pneumonia or pleural effusion.              Assessment  Active Hospital Problems    Diagnosis     Syncope and collapse [R55]      Priority: Medium       Patient Active Problem List    Diagnosis Date Noted    Syncope and collapse 01/15/2023     Priority: Medium    Cellulitis of right leg 09/21/2012    Immune deficiency disorder ([facility] [address] 09/21/2012       Plan  Syncope and collapse   TSH WNL  Tele   Orthostatic Bps   Gentle hydration over night   Hold alpha blockade for now   Cardiology consultation reviewed   ECHO noted today good LVSF   Stop BB on DC, Stop Chlorthalidone   Continue doxazosin for now he will follow up with urology soon to discuss   Bradycardia   Hold BB for now look to resume soon   Tele   PT/OT  Consults Cardiology   Home medications to be reconciled and confirmed prior to being ordered  DVT prophylaxis   Code Status Full   Discharge plan: DC home today with close op elle Allen MD  Internal medicine   1/16/2023  9:05 AM    I can be reached through [employer].    NOTE:  This report was transcribed using voice recognition software.  Every effort was made to ensure accuracy; however, inadvertent computerized transcription errors may be present.

## 2023-01-16 NOTE — CARE COORDINATION
Echo pending. CM made in room visit to pt and explained role of CM. Pt states he resides in a two story home w/spouse, two steps to enter and is independent. No home O2 or nebulizer, but has cpap. No hx of HHC or SNF stay. He still drives and is established w/Dr. Colt Agarwal and uses [facility] in [place]. He denies any needs for discharge and plans to return home with spouse to transport. CM will follow along with  and assist with discharge planning as necessary.    Melvyn Dakins, JORDANN, RN  [facility] Case Management  [phone]

## 2023-01-16 NOTE — DISCHARGE SUMMARY
The patient was discharged on the same day as history and physical.  Please see history and physical as well as imaging studies, consult and evaluations, and nurse's notes.     Electronically signed by Willis Nieto MD on 1/16/2023 at 4:33 PM

## 2023-01-16 NOTE — PROGRESS NOTES
As per wife the dose of doxazosin may have increased to 10mg. Wife will find out and let us know tomorrow.

## 2025-09-01 ENCOUNTER — APPOINTMENT (OUTPATIENT)
Dept: CT IMAGING | Age: 80
End: 2025-09-01
Payer: MEDICARE

## 2025-09-01 ENCOUNTER — HOSPITAL ENCOUNTER (EMERGENCY)
Age: 80
Discharge: HOME | End: 2025-09-01
Payer: MEDICARE

## 2025-09-01 VITALS
OXYGEN SATURATION: 96 % | DIASTOLIC BLOOD PRESSURE: 68 MMHG | HEIGHT: 70 IN | SYSTOLIC BLOOD PRESSURE: 137 MMHG | RESPIRATION RATE: 18 BRPM | BODY MASS INDEX: 27.35 KG/M2 | WEIGHT: 191 LBS | HEART RATE: 64 BPM | TEMPERATURE: 98.5 F

## 2025-09-01 DIAGNOSIS — G51.0 BELL'S PALSY: Primary | ICD-10-CM

## 2025-09-01 LAB
ALBUMIN SERPL-MCNC: 3.7 G/DL (ref 3.5–5.2)
ALP SERPL-CCNC: 82 U/L (ref 40–129)
ALT SERPL-CCNC: 21 U/L (ref 0–50)
ANION GAP SERPL CALCULATED.3IONS-SCNC: 12 MMOL/L (ref 7–16)
AST SERPL-CCNC: 21 U/L (ref 0–50)
BASOPHILS # BLD: 0.03 K/UL (ref 0–0.2)
BASOPHILS NFR BLD: 0 % (ref 0–2)
BILIRUB SERPL-MCNC: 0.9 MG/DL (ref 0–1.2)
BUN SERPL-MCNC: 17 MG/DL (ref 8–23)
CALCIUM SERPL-MCNC: 9.8 MG/DL (ref 8.8–10.2)
CHLORIDE SERPL-SCNC: 102 MMOL/L (ref 98–107)
CO2 SERPL-SCNC: 26 MMOL/L (ref 22–29)
CREAT SERPL-MCNC: 0.8 MG/DL (ref 0.7–1.2)
EOSINOPHIL # BLD: 0.03 K/UL (ref 0.05–0.5)
EOSINOPHILS RELATIVE PERCENT: 0 % (ref 0–6)
ERYTHROCYTE [DISTWIDTH] IN BLOOD BY AUTOMATED COUNT: 12.8 % (ref 11.5–15)
GFR, ESTIMATED: 90 ML/MIN/1.73M2
GLUCOSE SERPL-MCNC: 216 MG/DL (ref 74–99)
HCT VFR BLD AUTO: 42.5 % (ref 37–54)
HGB BLD-MCNC: 14.7 G/DL (ref 12.5–16.5)
IMM GRANULOCYTES # BLD AUTO: 0.07 K/UL (ref 0–0.58)
IMM GRANULOCYTES NFR BLD: 1 % (ref 0–5)
LYMPHOCYTES NFR BLD: 1.85 K/UL (ref 1.5–4)
LYMPHOCYTES RELATIVE PERCENT: 17 % (ref 20–42)
MCH RBC QN AUTO: 29.5 PG (ref 26–35)
MCHC RBC AUTO-ENTMCNC: 34.6 G/DL (ref 32–34.5)
MCV RBC AUTO: 85.3 FL (ref 80–99.9)
MONOCYTES NFR BLD: 0.67 K/UL (ref 0.1–0.95)
MONOCYTES NFR BLD: 6 % (ref 2–12)
NEUTROPHILS NFR BLD: 76 % (ref 43–80)
NEUTS SEG NFR BLD: 8.24 K/UL (ref 1.8–7.3)
PLATELET # BLD AUTO: 295 K/UL (ref 130–450)
PMV BLD AUTO: 10 FL (ref 7–12)
POTASSIUM SERPL-SCNC: 3.6 MMOL/L (ref 3.5–5.1)
PROT SERPL-MCNC: 6.9 G/DL (ref 6.4–8.3)
RBC # BLD AUTO: 4.98 M/UL (ref 3.8–5.8)
SODIUM SERPL-SCNC: 140 MMOL/L (ref 136–145)
WBC OTHER # BLD: 10.9 K/UL (ref 4.5–11.5)

## 2025-09-01 PROCEDURE — 85025 COMPLETE CBC W/AUTO DIFF WBC: CPT

## 2025-09-01 PROCEDURE — 70450 CT HEAD/BRAIN W/O DYE: CPT

## 2025-09-01 PROCEDURE — 99284 EMERGENCY DEPT VISIT MOD MDM: CPT

## 2025-09-01 PROCEDURE — 80053 COMPREHEN METABOLIC PANEL: CPT

## 2025-09-01 RX ORDER — PREDNISONE 20 MG/1
60 TABLET ORAL DAILY
Qty: 21 TABLET | Refills: 0 | Status: SHIPPED | OUTPATIENT
Start: 2025-09-01 | End: 2025-09-08

## 2025-09-01 ASSESSMENT — LIFESTYLE VARIABLES
HOW MANY STANDARD DRINKS CONTAINING ALCOHOL DO YOU HAVE ON A TYPICAL DAY: PATIENT DOES NOT DRINK
HOW OFTEN DO YOU HAVE A DRINK CONTAINING ALCOHOL: NEVER

## 2025-09-01 ASSESSMENT — PAIN SCALES - GENERAL: PAINLEVEL_OUTOF10: 0
